# Patient Record
Sex: FEMALE | Race: BLACK OR AFRICAN AMERICAN | HISPANIC OR LATINO | Employment: UNEMPLOYED | URBAN - METROPOLITAN AREA
[De-identification: names, ages, dates, MRNs, and addresses within clinical notes are randomized per-mention and may not be internally consistent; named-entity substitution may affect disease eponyms.]

---

## 2017-07-29 ENCOUNTER — HOSPITAL ENCOUNTER (EMERGENCY)
Facility: OTHER | Age: 37
Discharge: HOME | End: 2017-07-29
Attending: EMERGENCY MEDICINE
Payer: COMMERCIAL

## 2017-07-29 VITALS
RESPIRATION RATE: 17 BRPM | DIASTOLIC BLOOD PRESSURE: 82 MMHG | TEMPERATURE: 99 F | HEART RATE: 54 BPM | OXYGEN SATURATION: 100 % | SYSTOLIC BLOOD PRESSURE: 131 MMHG | BODY MASS INDEX: 31.18 KG/M2 | HEIGHT: 66 IN | WEIGHT: 194 LBS

## 2017-07-29 DIAGNOSIS — M53.3 SACROILIAC PAIN: ICD-10-CM

## 2017-07-29 DIAGNOSIS — S39.012A LUMBAR STRAIN, INITIAL ENCOUNTER: Primary | ICD-10-CM

## 2017-07-29 LAB
B-HCG UR QL: NEGATIVE
BILIRUB UR QL STRIP: NEGATIVE
CLARITY UR: CLEAR
COLOR UR: YELLOW
CTP QC/QA: YES
GLUCOSE UR QL STRIP: NEGATIVE
HGB UR QL STRIP: ABNORMAL
KETONES UR QL STRIP: NEGATIVE
LEUKOCYTE ESTERASE UR QL STRIP: NEGATIVE
NITRITE UR QL STRIP: NEGATIVE
PH UR STRIP: 7 [PH] (ref 5–8)
PROT UR QL STRIP: NEGATIVE
SP GR UR STRIP: 1.01 (ref 1–1.03)
URN SPEC COLLECT METH UR: ABNORMAL
UROBILINOGEN UR STRIP-ACNC: NEGATIVE EU/DL

## 2017-07-29 PROCEDURE — 81025 URINE PREGNANCY TEST: CPT | Performed by: EMERGENCY MEDICINE

## 2017-07-29 PROCEDURE — 63600175 PHARM REV CODE 636 W HCPCS: Performed by: PHYSICIAN ASSISTANT

## 2017-07-29 PROCEDURE — 96372 THER/PROPH/DIAG INJ SC/IM: CPT

## 2017-07-29 PROCEDURE — 81003 URINALYSIS AUTO W/O SCOPE: CPT

## 2017-07-29 PROCEDURE — 99284 EMERGENCY DEPT VISIT MOD MDM: CPT | Mod: 25

## 2017-07-29 RX ORDER — ORPHENADRINE CITRATE 100 MG/1
100 TABLET, EXTENDED RELEASE ORAL 2 TIMES DAILY
Qty: 10 TABLET | Refills: 0 | Status: SHIPPED | OUTPATIENT
Start: 2017-07-29 | End: 2017-08-03

## 2017-07-29 RX ORDER — KETOROLAC TROMETHAMINE 30 MG/ML
30 INJECTION, SOLUTION INTRAMUSCULAR; INTRAVENOUS
Status: COMPLETED | OUTPATIENT
Start: 2017-07-29 | End: 2017-07-29

## 2017-07-29 RX ORDER — ORPHENADRINE CITRATE 30 MG/ML
60 INJECTION INTRAMUSCULAR; INTRAVENOUS
Status: COMPLETED | OUTPATIENT
Start: 2017-07-29 | End: 2017-07-29

## 2017-07-29 RX ORDER — IBUPROFEN 600 MG/1
600 TABLET ORAL EVERY 6 HOURS PRN
Qty: 20 TABLET | Refills: 0 | Status: SHIPPED | OUTPATIENT
Start: 2017-07-29

## 2017-07-29 RX ADMIN — KETOROLAC TROMETHAMINE 30 MG: 30 INJECTION, SOLUTION INTRAMUSCULAR at 06:07

## 2017-07-29 RX ADMIN — ORPHENADRINE CITRATE 60 MG: 30 INJECTION INTRAMUSCULAR; INTRAVENOUS at 06:07

## 2017-07-29 NOTE — ED PROVIDER NOTES
"Encounter Date: 2017       History     Chief Complaint   Patient presents with    Back Pain     pt reports for the past 3 days she has been having pain to low back; denies any dysuria/hematuria; denies any falls/injuries; pain is described as a "pressure like pain" and is currently 2/10 with no movement and 10/10 with movement     Patient is 36-year-old female who presents with complaints of lower back pain medicine present for 1 week prior to arrival.  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  She describes it as a burning pain with associated numbness and tingling down both legs.  No report of bladder or bowel incontinence or saddle anesthesia.  Pain is worse with transitioning from seated position to standing position and seems to be improved with rest.  She reports taking one dose of ibuprofen with no significant improvement.  Denies fever, chills, chest pain or shortness of breath.  Currently accompanied by her young daughter who is at bedside.          Review of patient's allergies indicates:  No Known Allergies  Past Medical History:   Diagnosis Date    Hyperlipidemia     Hypertension     Prediabetes      Past Surgical History:   Procedure Laterality Date     SECTION      INGUINAL HERNIA REPAIR Bilateral      History reviewed. No pertinent family history.  Social History   Substance Use Topics    Smoking status: Never Smoker    Smokeless tobacco: Never Used    Alcohol use No     Review of Systems   Constitutional: Negative for fever.   HENT: Negative for sore throat.    Respiratory: Negative for shortness of breath.    Cardiovascular: Negative for chest pain.   Gastrointestinal: Negative for nausea.   Genitourinary: Negative for dysuria.   Musculoskeletal: Positive for back pain.   Skin: Negative for rash.   Neurological: Negative for weakness.   Hematological: Does not bruise/bleed easily.       Physical " Exam     Initial Vitals [07/29/17 1638]   BP Pulse Resp Temp SpO2   (!) 149/67 65 18 98.5 °F (36.9 °C) 99 %      MAP       94.33         Physical Exam    Nursing note and vitals reviewed.  Constitutional: She appears well-developed and well-nourished. She is not diaphoretic. No distress.   Healthy appearing female in no acute distress or apparent pain sitting comfortably and exam chair.  She does display discomfort when transitioning from seated position to standing position.  She makes good eye contact and is cooperative.   HENT:   Head: Normocephalic and atraumatic.   Eyes: Conjunctivae and EOM are normal. Pupils are equal, round, and reactive to light. Right eye exhibits no discharge. Left eye exhibits no discharge.   Neck: Normal range of motion.   Cardiovascular: Normal rate, regular rhythm, normal heart sounds and intact distal pulses. Exam reveals no friction rub.    No murmur heard.  Pulmonary/Chest: Breath sounds normal. She has no wheezes. She has no rhonchi. She has no rales.   Abdominal: Soft. Bowel sounds are normal. There is no tenderness. There is no rebound and no guarding.   Musculoskeletal: Normal range of motion. She exhibits no edema or tenderness.   There is no C, T, L midline bony tenderness crepitus or step-offs.   She has bilateral paraspinal musculature tenderness to palpation of lumbar region as well as sacroiliac discomfort to palpation.  No overlying skin changes.    Bilateral straight leg raise   Lymphadenopathy:     She has no cervical adenopathy.   Neurological: She is alert and oriented to person, place, and time. She has normal strength. No cranial nerve deficit or sensory deficit.   Normal sensation to light touch bilaterally with normal strength against resistance and pulses.   Skin: Skin is warm. Capillary refill takes less than 2 seconds. No rash and no abscess noted. No erythema.   Psychiatric: She has a normal mood and affect. Her behavior is normal. Thought content normal.          ED Course   Procedures  Labs Reviewed   POCT URINE PREGNANCY             Medical Decision Making:   ED Management:  Urgent evaluation of 36-year-old female who presents with complaints concerning for sacroiliac strain and lumbar strain.she is afebrile, nontoxic appearing, hemodynamically stable.  Physical exam reveals soft tissue tenderness to palpation to lumbar paraspinal musculature as well as sacroiliac region.  There is no associated bony tenderness or asymmetry.  No imaging felt warranted that she has no report of trauma.  Symptoms significantly improved with anti-inflammatory muscle relaxer.  We'll discharge with the same.  Urinalysis is unremarkable do not suspect renal pathology.  She is encouraged to follow-up with primary care 1-2 days for symptom recheck.  She is educated him return precautions and verbalizes understanding.  Case discussed with attending who agrees with plan.  Other:   I have discussed this case with another health care provider.       <> Summary of the Discussion: Angelic                   ED Course     Clinical Impression:   The primary encounter diagnosis was Lumbar strain, initial encounter. A diagnosis of Sacroiliac pain was also pertinent to this visit.                           Jeana Caldwell PA-C  07/29/17 6333

## 2017-08-23 ENCOUNTER — HOSPITAL ENCOUNTER (OUTPATIENT)
Dept: RADIOLOGY | Facility: HOSPITAL | Age: 37
Discharge: HOME | End: 2017-08-23
Attending: NURSE PRACTITIONER
Payer: COMMERCIAL

## 2017-08-23 VITALS — DIASTOLIC BLOOD PRESSURE: 91 MMHG | SYSTOLIC BLOOD PRESSURE: 143 MMHG | HEART RATE: 65 BPM

## 2017-08-23 DIAGNOSIS — I49.3 VENTRICULAR QUADRIGEMINY: ICD-10-CM

## 2017-08-23 DIAGNOSIS — R94.39 ABNORMAL STRESS ECHO: ICD-10-CM

## 2017-08-23 LAB
CREAT SERPL-MCNC: 0.8 MG/DL (ref 0.5–1.4)
SAMPLE: NORMAL

## 2017-08-23 PROCEDURE — 75574 CT ANGIO HRT W/3D IMAGE: CPT | Mod: TC

## 2017-08-23 PROCEDURE — 75574 CT ANGIO HRT W/3D IMAGE: CPT | Mod: 26,,, | Performed by: RADIOLOGY

## 2017-08-23 PROCEDURE — 25000003 PHARM REV CODE 250: Performed by: ORTHOPAEDIC SURGERY

## 2017-08-23 PROCEDURE — 25500020 PHARM REV CODE 255

## 2017-08-23 RX ORDER — NITROGLYCERIN 0.4 MG/1
0.4 TABLET SUBLINGUAL ONCE
Status: COMPLETED | OUTPATIENT
Start: 2017-08-23 | End: 2017-08-23

## 2017-08-23 RX ADMIN — NITROGLYCERIN 0.4 MG: 0.4 TABLET SUBLINGUAL at 09:08

## 2017-08-23 RX ADMIN — IOHEXOL 100 ML: 350 INJECTION, SOLUTION INTRAVENOUS at 09:08

## 2018-12-06 ENCOUNTER — HOSPITAL ENCOUNTER (OUTPATIENT)
Dept: RADIOLOGY | Facility: HOSPITAL | Age: 38
Discharge: HOME | End: 2018-12-06
Attending: PHYSICIAN ASSISTANT
Payer: COMMERCIAL

## 2018-12-06 ENCOUNTER — OFFICE VISIT (OUTPATIENT)
Dept: ORTHOPEDICS | Facility: CLINIC | Age: 38
End: 2018-12-06
Payer: COMMERCIAL

## 2018-12-06 VITALS
DIASTOLIC BLOOD PRESSURE: 88 MMHG | HEIGHT: 66 IN | SYSTOLIC BLOOD PRESSURE: 134 MMHG | WEIGHT: 194 LBS | BODY MASS INDEX: 31.18 KG/M2 | HEART RATE: 76 BPM

## 2018-12-06 DIAGNOSIS — M25.462 EFFUSION, LEFT KNEE: ICD-10-CM

## 2018-12-06 DIAGNOSIS — M25.562 LEFT KNEE PAIN, UNSPECIFIED CHRONICITY: ICD-10-CM

## 2018-12-06 DIAGNOSIS — M25.562 LEFT KNEE PAIN, UNSPECIFIED CHRONICITY: Primary | ICD-10-CM

## 2018-12-06 PROCEDURE — 73562 X-RAY EXAM OF KNEE 3: CPT | Mod: 26,LT,, | Performed by: RADIOLOGY

## 2018-12-06 PROCEDURE — 3008F BODY MASS INDEX DOCD: CPT | Mod: CPTII,S$GLB,, | Performed by: PHYSICIAN ASSISTANT

## 2018-12-06 PROCEDURE — 99203 OFFICE O/P NEW LOW 30 MIN: CPT | Mod: 25,S$GLB,, | Performed by: PHYSICIAN ASSISTANT

## 2018-12-06 PROCEDURE — 99999 PR PBB SHADOW E&M-EST. PATIENT-LVL III: CPT | Mod: PBBFAC,,, | Performed by: PHYSICIAN ASSISTANT

## 2018-12-06 PROCEDURE — 20610 DRAIN/INJ JOINT/BURSA W/O US: CPT | Mod: LT,S$GLB,, | Performed by: PHYSICIAN ASSISTANT

## 2018-12-06 PROCEDURE — 73562 X-RAY EXAM OF KNEE 3: CPT | Mod: TC,LT

## 2018-12-06 PROCEDURE — 73560 X-RAY EXAM OF KNEE 1 OR 2: CPT | Mod: TC,RT

## 2018-12-06 PROCEDURE — 73560 X-RAY EXAM OF KNEE 1 OR 2: CPT | Mod: 26,XS,RT, | Performed by: RADIOLOGY

## 2018-12-06 RX ORDER — BETAMETHASONE SODIUM PHOSPHATE AND BETAMETHASONE ACETATE 3; 3 MG/ML; MG/ML
6 INJECTION, SUSPENSION INTRA-ARTICULAR; INTRALESIONAL; INTRAMUSCULAR; SOFT TISSUE
Status: COMPLETED | OUTPATIENT
Start: 2018-12-06 | End: 2018-12-06

## 2018-12-06 RX ORDER — MELOXICAM 15 MG/1
15 TABLET ORAL DAILY
COMMUNITY

## 2018-12-06 RX ADMIN — BETAMETHASONE SODIUM PHOSPHATE AND BETAMETHASONE ACETATE 6 MG: 3; 3 INJECTION, SUSPENSION INTRA-ARTICULAR; INTRALESIONAL; INTRAMUSCULAR; SOFT TISSUE at 12:12

## 2018-12-06 NOTE — PROGRESS NOTES
SUBJECTIVE:     Chief Complaint & History of Present Illness:  Ursula Cotter is a New patient 37 y.o. female who is seen here today with a complaint of    Chief Complaint   Patient presents with    Left Knee - Pain    .  Here today for evaluation treatment of sudden onset left knee pain over the past several weeks.  Patient states that she did slip and fall in the bathtub striking the knee is had difficulty with swelling and range of motion since that time she is able to stand and bear weight but does have a feeling of instability with lateral pressures.  On a scale of 1-10, with 10 being worst pain imaginable, he rates this pain as 4 on good days and 7 on bad days.  she describes the pain as tender.    Review of patient's allergies indicates:  No Known Allergies      Current Outpatient Medications   Medication Sig Dispense Refill    ibuprofen (ADVIL,MOTRIN) 600 MG tablet Take 1 tablet (600 mg total) by mouth every 6 (six) hours as needed for Pain. 20 tablet 0    meloxicam (MOBIC) 15 MG tablet Take 15 mg by mouth once daily.       No current facility-administered medications for this visit.        Past Medical History:   Diagnosis Date    Hyperlipidemia     Hypertension     Prediabetes        Past Surgical History:   Procedure Laterality Date     SECTION      INGUINAL HERNIA REPAIR Bilateral        Vital Signs (Most Recent)  Vitals:    18 1103   BP: 134/88   Pulse: 76           Review of Systems:  ROS:  Constitutional: no fever or chills  Eyes: no visual changes  ENT: no nasal congestion or sore throat  Respiratory: no cough or shortness of breath  Cardiovascular: no chest pain or palpitations  Gastrointestinal: no nausea or vomiting, tolerating diet  Genitourinary: no hematuria or dysuria  Integument/Breast: no rash or pruritis  Hematologic/Lymphatic: no easy bruising or lymphadenopathy  Musculoskeletal: no arthralgias or myalgias  Neurological: no seizures or  "tremors  Behavioral/Psych: no auditory or visual hallucinations  Endocrine: no heat or cold intolerance                OBJECTIVE:     PHYSICAL EXAM:  Height: 5' 6" (167.6 cm) Weight: 88 kg (194 lb 0.1 oz), General Appearance: Well nourished, well developed, in no acute distress.  Neurological: Mood & affect are normal.  left  Knee Exam:  Knee Range of Motion:0-100 degrees flexion   Effusion:yes  Condition of skin:intact  Location of tenderness:Medial joint line and Patella   Strength:limited by pain and 5 of 5  Stability:  Lachman: stable, LCL: stable, MCL: stable, PCL: stable and posteromedial (dial): stable  Varus /Valgus stress:  normal  Tommie:   negative/negative    right  Knee Exam:  Knee Range of Motion:0-125 degrees flexion   Effusion:none  Condition of skin:intact  Location of tenderness:None   Strength:5 of 5  Stability:  stable to testing  Varus /Valgus stress:  normal  Tommie:   negative/negative      Hip Examination:  full painless range of motion, without tenderness    RADIOGRAPHS:  X-rays taken today films reviewed by me demonstrate mild arthritic changes throughout both knees right more so than left with tricompartment joint space narrowing and early sclerotic changes no evidence of fracture dislocation or other bony abnormalities    ASSESSMENT/PLAN:     Plan: We discussed with the patient at length all the different treatment options available for  the knee including anti-inflammatories, acetaminophen, rest, ice, knee strengthening exercise, occasional cortisone injections for temporary relief, Viscosupplimentation injections, arthroscopic debridement osteotomy, and finally knee arthroplasty.   Will proceed with aspiration and cortisone injection of the left knee     The injection site was identified and the skin was prepared with a betadine solution. The   left  knee was aspirated and 60 cc of normal-appearing fluid was withdrawn the knee was then injected with 1 ml of Celestone and 5 ml " Lidocaine under sterile technique. Ursula Cotter tolerated the procedure well, she was advised to rest the knee today, ice and elevation. she did receive immediate relief of the pain in and about his knee she was told this would be short lived and is secondary to the lidocaine. she may have an increase in his discomfort tonight followed by steady improvement over the next several days. I may take 1-3 weeks following the injection to get the full benefit of the medication.  Patient was placed in a pressure dressing for the left knee to advised to leave the dressing on for the remainder of today room with the night before bed and to wear it all day tomorrow I will see her back in 3-6 months. Sooner if he has any problems or concerns.

## 2018-12-06 NOTE — LETTER
December 6, 2018      Bg Clay MD  [address] [facility] [address]           [facility] - Orthoped[facility]  [address], [address]  [facility] [address]  Phone: [phone]          Patient: Ursula Cotter   MR Number: [id number]   YOB: 1980   Date of Visit: 12/6/2018       Dear Dr. Bg Clay:    Thank you for referring Ursula Cotter to me for evaluation. Attached you will find relevant portions of my assessment and plan of care.    If you have questions, please do not hesitate to call me. I look forward to following Ursula Cotter along with you.    Sincerely,    Jonna Sawyer MA    Enclosure  CC:  No Recipients    If you would like to receive this communication electronically, please contact [email or web address] or [phone] to request more information on [employer] access.    For providers and/or their staff who would like to refer a patient to [facility], please contact us through our one-stop-shop provider referral line, [facility], at [phone].    If you feel you have received this communication in error or would no longer like to receive these types of communications, please e-mail [email or web address]

## 2020-02-15 ENCOUNTER — HOSPITAL ENCOUNTER (EMERGENCY)
Facility: OTHER | Age: 40
Discharge: HOME | End: 2020-02-15
Attending: EMERGENCY MEDICINE
Payer: COMMERCIAL

## 2020-02-15 VITALS
SYSTOLIC BLOOD PRESSURE: 178 MMHG | OXYGEN SATURATION: 98 % | HEIGHT: 66 IN | BODY MASS INDEX: 31.5 KG/M2 | WEIGHT: 196 LBS | RESPIRATION RATE: 18 BRPM | DIASTOLIC BLOOD PRESSURE: 100 MMHG | HEART RATE: 52 BPM | TEMPERATURE: 98 F

## 2020-02-15 DIAGNOSIS — H72.91 RUPTURED TYMPANIC MEMBRANE, RIGHT: Primary | ICD-10-CM

## 2020-02-15 LAB
B-HCG UR QL: NEGATIVE
CTP QC/QA: YES

## 2020-02-15 PROCEDURE — 25000003 PHARM REV CODE 250: Performed by: EMERGENCY MEDICINE

## 2020-02-15 PROCEDURE — 81025 URINE PREGNANCY TEST: CPT | Performed by: EMERGENCY MEDICINE

## 2020-02-15 PROCEDURE — 99282 EMERGENCY DEPT VISIT SF MDM: CPT | Mod: 25

## 2020-02-15 RX ORDER — PRAVASTATIN SODIUM 40 MG/1
40 TABLET ORAL DAILY
COMMUNITY

## 2020-02-15 RX ADMIN — CARBAMIDE PEROXIDE 6.5% 5 DROP: 6.5 LIQUID AURICULAR (OTIC) at 05:02

## 2020-02-15 NOTE — ED PROVIDER NOTES
"Encounter Date: 2/15/2020    SCRIBE #1 NOTE: I, Betzadia Ingram, am scribing for, and in the presence of, Dr. Mon.       History     Chief Complaint   Patient presents with    Otalgia     pt with right ear and neck pain x 6 days.      Time seen by provider: 5:17 PM    This is a 39 y.o. female who presents with complaint of right ear discomfort for the past six days. Per daughter, patient has been having a "heart beat sensation" to her right ear for the past six days. She notes that the sensation began to travel down her neck today. She states "it feels like her ear is clogged." She notes patient uses Q-tips to clean ears. She denies ear pain, ear discharge, fever, nausea, vomiting, chest pain, or SOB. She denies having a PCP. She denies tobacco use or alcohol consumption. Patient has NKDA. She reports taking daily medication for HTN and HLD.    The history is provided by the patient and a relative.     Review of patient's allergies indicates:  No Known Allergies  Past Medical History:   Diagnosis Date    Hyperlipidemia     Hypertension     Prediabetes      Past Surgical History:   Procedure Laterality Date     SECTION      INGUINAL HERNIA REPAIR Bilateral      History reviewed. No pertinent family history.  Social History     Tobacco Use    Smoking status: Never Smoker    Smokeless tobacco: Never Used   Substance Use Topics    Alcohol use: No    Drug use: No     Review of Systems   Constitutional: Negative for chills and fever.   HENT: Negative for congestion, ear discharge, ear pain, hearing loss, sore throat and trouble swallowing.         Positive for discomfort to right ear.   Eyes: Negative for photophobia and visual disturbance.   Respiratory: Negative for cough, chest tightness and shortness of breath.    Cardiovascular: Negative for chest pain.   Gastrointestinal: Negative for abdominal pain, nausea and vomiting.   Endocrine: Negative for polydipsia and polyuria.   Genitourinary: " Negative for difficulty urinating, dysuria and flank pain.   Musculoskeletal: Negative for back pain and neck pain.   Skin: Negative for rash.   Neurological: Negative for weakness and headaches.   Hematological: Does not bruise/bleed easily.   Psychiatric/Behavioral: Negative for confusion.       Physical Exam     Initial Vitals [02/15/20 1711]   BP Pulse Resp Temp SpO2   (!) 164/86 66 18 98.4 °F (36.9 °C) 99 %      MAP       --         Physical Exam    Nursing note and vitals reviewed.  Constitutional: She appears well-developed and well-nourished. No distress.   HENT:   Head: Normocephalic and atraumatic.   Mouth/Throat: Oropharynx is clear and moist.   No tenderness with palpation of tragus. Ruptured TM on right.   Eyes: Conjunctivae and EOM are normal. Pupils are equal, round, and reactive to light.   Neck: Normal range of motion. Neck supple.   No audible carotid bruits.   Cardiovascular: Normal rate, regular rhythm and normal heart sounds.   Pulmonary/Chest: Breath sounds normal. No respiratory distress.   Abdominal: Soft. There is no tenderness.   Musculoskeletal: Normal range of motion.   Lymphadenopathy:     She has no cervical adenopathy.   Neurological: She is alert and oriented to person, place, and time. She has normal strength. No cranial nerve deficit or sensory deficit.   Skin: Skin is warm and dry. No rash noted.         ED Course   Procedures  Labs Reviewed   POCT URINE PREGNANCY - Normal          Imaging Results    None          Medical Decision Making:   History:   Old Medical Records: I decided to obtain old medical records.  Clinical Tests:   Lab Tests: Ordered and Reviewed            Scribe Attestation:   Scribe #1: I performed the above scribed service and the documentation accurately describes the services I performed. I attest to the accuracy of the note.    Attending Attestation:           Physician Attestation for Scribe:  Physician Attestation Statement for Scribe #1: I, Dr. Mon,  reviewed documentation, as scribed by Betzaida Ingram in my presence, and it is both accurate and complete.         Attending ED Notes:   Emergent evaluation a 39-year-old female with complaint of ear fullness and pressure over the past 6 days with increasing peaking pressure and discomfort that occurred today.  Patient is afebrile, nontoxic-appearing stable vital signs except for elevation of blood pressure.  After ear canal was irrigated patient was found to have ruptured tympanic membrane.  No tragal tenderness to palpation.  No clinical evidence of otitis externa.  The patient is extensively counseled her diagnosis and treatment, discharged good condition and directed follow-up with ENT in the next 24-48 hours.                        Clinical Impression:     1. Ruptured tympanic membrane, right                                Len Mon MD  02/15/20 6888

## 2020-02-15 NOTE — ED TRIAGE NOTES
Pt arrived with c/o R otalgia x 6 days.  Denies any fever.  Denies drainage from ears.  Also reports R neck pain and R shoulder pain that started today.  Denies any falls, heavy lifting, or repetitive motions.  FROM to all extremities.

## 2020-02-16 NOTE — ED NOTES
BP:178/100 (manual)    AAOx4. BP:178/100 States she did not take her bp med at bedtime. C/o R ear pain 3/10. Denies any cp, sob, dizziness, blurred vision, numbness, weakness or any other discomfort at this time. Dr. Mon  Notified of bp:178/100. Pt instructed to take her bp med when she gets home and to f/up with her PCP regarding elevated BP per MD. Pt verbalized understanding. Ok to discharge pt to home per Dr. Mon.

## 2020-02-24 ENCOUNTER — OFFICE VISIT (OUTPATIENT)
Dept: OTOLARYNGOLOGY | Facility: CLINIC | Age: 40
End: 2020-02-24
Payer: COMMERCIAL

## 2020-02-24 VITALS
BODY MASS INDEX: 31.31 KG/M2 | HEART RATE: 63 BPM | WEIGHT: 194 LBS | SYSTOLIC BLOOD PRESSURE: 136 MMHG | DIASTOLIC BLOOD PRESSURE: 86 MMHG

## 2020-02-24 DIAGNOSIS — H60.313 CHRONIC DIFFUSE OTITIS EXTERNA OF BOTH EARS: ICD-10-CM

## 2020-02-24 DIAGNOSIS — H61.23 BILATERAL IMPACTED CERUMEN: Primary | ICD-10-CM

## 2020-02-24 PROCEDURE — 99999 PR PBB SHADOW E&M-EST. PATIENT-LVL II: ICD-10-PCS | Mod: PBBFAC,,, | Performed by: NURSE PRACTITIONER

## 2020-02-24 PROCEDURE — 3008F BODY MASS INDEX DOCD: CPT | Mod: CPTII,S$GLB,, | Performed by: NURSE PRACTITIONER

## 2020-02-24 PROCEDURE — 99999 PR PBB SHADOW E&M-EST. PATIENT-LVL II: CPT | Mod: PBBFAC,,, | Performed by: NURSE PRACTITIONER

## 2020-02-24 PROCEDURE — 69210 REMOVE IMPACTED EAR WAX UNI: CPT | Mod: S$GLB,,, | Performed by: NURSE PRACTITIONER

## 2020-02-24 PROCEDURE — 99202 PR OFFICE/OUTPT VISIT, NEW, LEVL II, 15-29 MIN: ICD-10-PCS | Mod: 25,S$GLB,, | Performed by: NURSE PRACTITIONER

## 2020-02-24 PROCEDURE — 3008F PR BODY MASS INDEX (BMI) DOCUMENTED: ICD-10-PCS | Mod: CPTII,S$GLB,, | Performed by: NURSE PRACTITIONER

## 2020-02-24 PROCEDURE — 99202 OFFICE O/P NEW SF 15 MIN: CPT | Mod: 25,S$GLB,, | Performed by: NURSE PRACTITIONER

## 2020-02-24 PROCEDURE — 69210 EAR CERUMEN REMOVAL: ICD-10-PCS | Mod: S$GLB,,, | Performed by: NURSE PRACTITIONER

## 2020-02-24 RX ORDER — HYDROCORTISONE AND ACETIC ACID 20.75; 10.375 MG/ML; MG/ML
4 SOLUTION AURICULAR (OTIC) 3 TIMES DAILY
Qty: 15 ML | Refills: 0 | Status: SHIPPED | OUTPATIENT
Start: 2020-02-24 | End: 2020-03-02

## 2020-02-24 NOTE — PROGRESS NOTES
Subjective:      Ursula Cotter is a 39 y.o. female who was self-referred for ear drum rupture.    Ms. Cotter reports itching in both ears for many years. She currently denies ear pain, ear drainage or change in hearing. She states she was told that she has an ear perforation and would like to have hear ears evaluated. She has not tried anything for her ear itching.    Past Medical History  She has a past medical history of Hyperlipidemia, Hypertension, and Prediabetes.    Past Surgical History  She has a past surgical history that includes  section and Inguinal hernia repair (Bilateral).    Family History  Her family history is not on file.    Social History  She reports that she has never smoked. She has never used smokeless tobacco. She reports that she does not drink alcohol or use drugs.    Allergies  She has No Known Allergies.    Medications  She has a current medication list which includes the following prescription(s): acetic acid-hydrocortisone, hydrochlorothiazide, ibuprofen, meloxicam, and pravastatin.    Review of Systems   Constitutional: Negative for chills, fever and unexpected weight change.   HENT: Negative for congestion, ear discharge, ear pain, facial swelling, hearing loss, postnasal drip, sinus pressure, sore throat, tinnitus and trouble swallowing.         Ear itching   Eyes: Negative for pain and visual disturbance.   Respiratory: Negative for apnea and shortness of breath.    Cardiovascular: Negative for chest pain and palpitations.   Gastrointestinal: Negative for abdominal pain and nausea.   Endocrine: Negative for cold intolerance and heat intolerance.   Musculoskeletal: Negative for joint swelling and neck stiffness.   Skin: Negative for color change and rash.   Neurological: Negative for dizziness, facial asymmetry and headaches.   Hematological: Negative for adenopathy. Does not bruise/bleed easily.   Psychiatric/Behavioral: Negative for agitation. The patient  is not nervous/anxious.           Objective:     /86   Pulse 63   Wt 88 kg (194 lb 0.1 oz)   BMI 31.31 kg/m²      Constitutional:   Vital signs are normal. She appears well-developed and well-nourished.     Head:  Normocephalic and atraumatic.     Ears:    Right Ear: No lacerations. No drainage, swelling or tenderness. No foreign bodies. No mastoid tenderness. Tympanic membrane is not injected, not scarred, not perforated, not erythematous, not retracted and not bulging. Tympanic membrane mobility is normal. No middle ear effusion. No hemotympanum.   Left Ear: No lacerations. No drainage, swelling or tenderness. No foreign bodies. No mastoid tenderness. Tympanic membrane is not injected, not scarred, not perforated, not erythematous, not retracted and not bulging. Tympanic membrane mobility is normal.  No middle ear effusion. No hemotympanum.   Ears:      Nose:  Nose normal including turbinates, nasal mucosa, sinuses and nasal septum.     Neck:  Neck normal without thyromegaly masses, asymmetry, normal tracheal structure, crepitus, and tenderness and no adenopathy.     Psychiatric:   She has a normal mood and affect.       Procedure    Cerumen removal performed.  See procedure note.        Data Reviewed    No results found for: WBC  No results found for: PLT   No results found for: CREATININE  No results found for: TSH  No results found for: GLU  No results found for: HGBA1C       Assessment:     1. Bilateral impacted cerumen    2. Chronic diffuse otitis externa of both ears         Plan:     I had a long discussion with the patient regarding her condition and the further workup and management options.    Ursula was seen today for ear fullness.    Diagnoses and all orders for this visit:    Bilateral impacted cerumen  -     Ear Cerumen Removal    Chronic diffuse otitis externa of both ears  -     acetic acid-hydrocortisone (VOSOL-HC) otic solution; Place 4 drops into both ears 3 (three) times daily. for 7  days      Follow up if symptoms worsen or fail to improve.

## 2020-02-24 NOTE — PROCEDURES
Ear Cerumen Removal  Date/Time: 2/24/2020 11:30 AM  Performed by: Conchis Robison NP  Authorized by: Conchis Robison NP     Location details:  Both ears  Procedure type: curette    Cerumen  Removal Results:  Cerumen completely removed  Patient tolerance:  Patient tolerated the procedure well with no immediate complications     Procedure Note:    The patient was brought to the minor procedure room and placed under the operating microscope of the left ear canal which was cleaned of ceruminous debris. Using a combination of suction, curettes and cup forceps the patient's cerumen impaction was removed. The tympanic membrane was evaluated and was unremarkable. The patient tolerated the procedure well. There were no complications.  Procedure Note:    Patient was brought to the minor procedure room and using the operating microscope of the right ear canal which was cleaned of ceruminous debris. There was a significant cerumen impaction.  Using a combination of suction, curettes and cup forceps the patient's cerumen impaction was removed. Tympanic membrane intact. Pt tolerated well. There were no complications.

## 2022-10-09 ENCOUNTER — HOSPITAL ENCOUNTER (EMERGENCY)
Facility: OTHER | Age: 42
Discharge: HOME | End: 2022-10-09
Attending: EMERGENCY MEDICINE
Payer: MEDICAID

## 2022-10-09 VITALS
HEART RATE: 59 BPM | RESPIRATION RATE: 16 BRPM | WEIGHT: 200 LBS | DIASTOLIC BLOOD PRESSURE: 75 MMHG | BODY MASS INDEX: 32.14 KG/M2 | OXYGEN SATURATION: 100 % | SYSTOLIC BLOOD PRESSURE: 109 MMHG | HEIGHT: 66 IN | TEMPERATURE: 98 F

## 2022-10-09 DIAGNOSIS — S99.922A INJURY OF LEFT FOOT, INITIAL ENCOUNTER: Primary | ICD-10-CM

## 2022-10-09 DIAGNOSIS — M79.672 LEFT FOOT PAIN: ICD-10-CM

## 2022-10-09 PROCEDURE — 90715 TDAP VACCINE 7 YRS/> IM: CPT | Performed by: EMERGENCY MEDICINE

## 2022-10-09 PROCEDURE — 90471 IMMUNIZATION ADMIN: CPT | Performed by: EMERGENCY MEDICINE

## 2022-10-09 PROCEDURE — 63600175 PHARM REV CODE 636 W HCPCS: Performed by: EMERGENCY MEDICINE

## 2022-10-09 PROCEDURE — 99284 EMERGENCY DEPT VISIT MOD MDM: CPT | Mod: 25

## 2022-10-09 RX ORDER — ACETAMINOPHEN 500 MG
500 TABLET ORAL EVERY 6 HOURS PRN
Qty: 60 TABLET | Refills: 0 | Status: SHIPPED | OUTPATIENT
Start: 2022-10-09

## 2022-10-09 RX ORDER — LEVOFLOXACIN 500 MG/1
750 TABLET, FILM COATED ORAL DAILY
Qty: 5 TABLET | Refills: 0 | Status: SHIPPED | OUTPATIENT
Start: 2022-10-09 | End: 2022-10-12

## 2022-10-09 RX ORDER — IBUPROFEN 600 MG/1
600 TABLET ORAL EVERY 6 HOURS PRN
Qty: 20 TABLET | Refills: 0 | Status: SHIPPED | OUTPATIENT
Start: 2022-10-09

## 2022-10-09 RX ADMIN — CLOSTRIDIUM TETANI TOXOID ANTIGEN (FORMALDEHYDE INACTIVATED), CORYNEBACTERIUM DIPHTHERIAE TOXOID ANTIGEN (FORMALDEHYDE INACTIVATED), BORDETELLA PERTUSSIS TOXOID ANTIGEN (GLUTARALDEHYDE INACTIVATED), BORDETELLA PERTUSSIS FILAMENTOUS HEMAGGLUTININ ANTIGEN (FORMALDEHYDE INACTIVATED), BORDETELLA PERTUSSIS PERTACTIN ANTIGEN, AND BORDETELLA PERTUSSIS FIMBRIAE 2/3 ANTIGEN 0.5 ML: 5; 2; 2.5; 5; 3; 5 INJECTION, SUSPENSION INTRAMUSCULAR at 04:10

## 2022-10-09 NOTE — ED TRIAGE NOTES
Pt presents to ED c/o pain to L foot s/p stepping on fernando nail. Pt states nail went through shoe. Small puncture wound noted to mid sole of L foot, no active bleeding, redness, or swelling.

## 2022-10-09 NOTE — Clinical Note
"John JASMINEJACKELIN Cotter was seen and treated in our emergency department on 10/9/2022.  She may return to work on 10/11/2022.       If you have any questions or concerns, please don't hesitate to call.      Demetrio Valentine MD"

## 2022-10-09 NOTE — ED PROVIDER NOTES
Encounter Date: 10/9/2022       History     Chief Complaint   Patient presents with    Foot Injury     Walking around in back yard and stepped on a fernando nail with left foot this afternoon. Reported unkn. Last tetanus shot.      HPI    41-year-old female with past medical history hyperlipidemia, hypertension presents after stepping on a fernando nail to left foot this afternoon.  Reports unknown last tetanus.  Reports it went through the sole of her slipper, states she immediately cleaned the wound denies noted some slight pain to the area.  Denies any further associated symptoms or any additional complaints.    Review of patient's allergies indicates:  No Known Allergies  Past Medical History:   Diagnosis Date    Hyperlipidemia     Hypertension     Prediabetes      Past Surgical History:   Procedure Laterality Date     SECTION      INGUINAL HERNIA REPAIR Bilateral      No family history on file.  Social History     Tobacco Use    Smoking status: Never    Smokeless tobacco: Never   Substance Use Topics    Alcohol use: No    Drug use: No     Review of Systems   Constitutional: Negative.    HENT: Negative.     Eyes: Negative.    Respiratory: Negative.     Cardiovascular: Negative.    Gastrointestinal: Negative.    Genitourinary: Negative.    Musculoskeletal:         Left foot pain   Skin: Negative.    Neurological: Negative.      Physical Exam     Initial Vitals [10/09/22 1546]   BP Pulse Resp Temp SpO2   (!) 129/90 77 16 98.5 °F (36.9 °C) 98 %      MAP       --         Physical Exam    Nursing note and vitals reviewed.  Constitutional: She appears well-developed and well-nourished. She is not diaphoretic. No distress.   HENT:   Head: Normocephalic and atraumatic.   Eyes: Pupils are equal, round, and reactive to light. Right eye exhibits no discharge. Left eye exhibits no discharge.   Neck: No tracheal deviation present.   Normal range of motion.  Cardiovascular:  Normal rate and regular rhythm.            Pulmonary/Chest: No stridor. No respiratory distress.   Musculoskeletal:         General: Tenderness (ttp over left forefoot with some associated mild hemorrhage and punctate wound, SILT throughout left foot, 2+ dp/pt pulses) present. No edema.      Cervical back: Normal range of motion.     Neurological: She is alert and oriented to person, place, and time.   Skin: Skin is warm and dry.       ED Course   Procedures  Labs Reviewed - No data to display       Imaging Results              X-Ray Foot Complete Left (Final result)  Result time 10/09/22 17:15:50      Final result by Kathryn Ardon MD (10/09/22 17:15:50)                   Impression:      No acute osseous abnormality identified.      Electronically signed by: Kathryn Ardon MD  Date:    10/09/2022  Time:    17:15               Narrative:    EXAMINATION:  XR FOOT COMPLETE 3 VIEW LEFT    CLINICAL HISTORY:  .  Pain in left foot    TECHNIQUE:  AP, lateral and oblique views of the left foot were performed.    COMPARISON:  None    FINDINGS:  No evidence of acute displaced fracture, dislocation, or osseous destructive process.  Lisfranc articulation is congruent.  No radiopaque retained foreign body seen.                                       Medications   Tdap vaccine injection 0.5 mL (0.5 mLs Intramuscular Given 10/9/22 5342)                      MDM:    41-year-old female with past medical history hyperlipidemia, hypertension presents after stepping on a fernando nail to left foot this afternoon. Xray left foot unremarkable.  Tdap updated. Wound cleaned at bedside, will place on 3 days of levaquin for prophylaxis.  At this point time based on physical exam evaluation not suspect acute fracture, dislocation, septic arthritis, or any further surgical or medical emergency. Discussed diagnosis and further treatment with patient and family at bedside, including f/u.  Return precautions given and all questions answered.  Patient and family in understanding of plan.   Pt discharged to home improved and stable.              Clinical Impression:   Final diagnoses:  [M79.672] Left foot pain  [S99.922A] Injury of left foot, initial encounter (Primary)        ED Disposition Condition    Discharge Stable          ED Prescriptions       Medication Sig Dispense Start Date End Date Auth. Provider    levoFLOXacin (LEVAQUIN) 500 MG tablet Take 1.5 tablets (750 mg total) by mouth once daily. for 3 days 5 tablet 10/9/2022 10/12/2022 Demetrio Valentine MD    ibuprofen (ADVIL,MOTRIN) 600 MG tablet Take 1 tablet (600 mg total) by mouth every 6 (six) hours as needed for Pain. 20 tablet 10/9/2022 -- Demetrio Valentine MD    acetaminophen (TYLENOL) 500 MG tablet Take 1 tablet (500 mg total) by mouth every 6 (six) hours as needed for Pain. 60 tablet 10/9/2022 -- Demetrio Valentine MD          Follow-up Information       Follow up With Specialties Details Why Contact Info    [facility] - Emergency Dept Emergency Medicine Go to  If symptoms worsen [address] [facility] [id number] [phone]    Pratima Avilez NP Family Medicine Go in 1 week As needed [address] [facility] [id number]  [phone]               Demetrio Valentine MD  10/09/22 2017